# Patient Record
Sex: FEMALE | Race: WHITE | Employment: OTHER | URBAN - METROPOLITAN AREA
[De-identification: names, ages, dates, MRNs, and addresses within clinical notes are randomized per-mention and may not be internally consistent; named-entity substitution may affect disease eponyms.]

---

## 2017-02-09 PROBLEM — I10 HYPERTENSION GOAL BP (BLOOD PRESSURE) < 140/90: Status: ACTIVE | Noted: 2017-02-09

## 2017-02-09 PROBLEM — Z71.3 WEIGHT LOSS COUNSELING, ENCOUNTER FOR: Status: ACTIVE | Noted: 2017-02-09

## 2017-02-09 PROBLEM — Z72.0 TOBACCO ABUSE: Chronic | Status: ACTIVE | Noted: 2017-02-09

## 2017-02-09 PROBLEM — Z72.0 TOBACCO ABUSE: Status: ACTIVE | Noted: 2017-02-09

## 2017-02-09 PROBLEM — K25.3 ACUTE GASTRIC ULCER: Status: ACTIVE | Noted: 2017-02-09

## 2017-02-09 PROBLEM — I10 HYPERTENSION GOAL BP (BLOOD PRESSURE) < 140/90: Chronic | Status: ACTIVE | Noted: 2017-02-09

## 2018-03-06 PROBLEM — E83.42 HYPOMAGNESEMIA: Status: ACTIVE | Noted: 2018-03-06

## 2018-03-15 ENCOUNTER — OFFICE VISIT (OUTPATIENT)
Dept: UROLOGY | Age: 58
End: 2018-03-15
Payer: COMMERCIAL

## 2018-03-15 VITALS
SYSTOLIC BLOOD PRESSURE: 135 MMHG | BODY MASS INDEX: 40.29 KG/M2 | TEMPERATURE: 98.1 F | HEART RATE: 82 BPM | HEIGHT: 61 IN | RESPIRATION RATE: 16 BRPM | WEIGHT: 213.41 LBS | DIASTOLIC BLOOD PRESSURE: 85 MMHG

## 2018-03-15 DIAGNOSIS — N20.1 URETERAL STONE: Primary | ICD-10-CM

## 2018-03-15 PROCEDURE — 99204 OFFICE O/P NEW MOD 45 MIN: CPT | Performed by: UROLOGY

## 2018-03-15 ASSESSMENT — ENCOUNTER SYMPTOMS
EYE PAIN: 0
NAUSEA: 1
COUGH: 0
ABDOMINAL PAIN: 0
EYE REDNESS: 0
BACK PAIN: 1
VOMITING: 0
COLOR CHANGE: 0
WHEEZING: 0
SHORTNESS OF BREATH: 0

## 2018-03-15 NOTE — PROGRESS NOTES
[facility]  [facility] - [place]  [address]  [address]  [address] [facility] [address]  Dept: [address] Shanel Cornejo [facility] Urology Office Note - New Patient    Patient:  Armin Ridley  YOB: 1960  Date: 3/15/2018    The patient is a 65 y.o. female who presents today for evaluation of the following problems:   Chief Complaint   Patient presents with    Nephrolithiasis     passed stone - CT in care everywhere    referred by Ryan Moon MD.    HPI  She is here for kidney stones. She was seen in the ER last week. She was able to pass the stone. Her pain was on the left side. She is now pain free. She has a h/o stones. She has not seen a urologist in the past.  No other voiding complaints. CT showed a 3mm stone at the left UVJ. She has had 4 stones, and they have never required surgery. No family history of stones. (Patient's old records have been requested, reviewed and summarized in today's note.)    Summary of old records: N/A    Additional History: N/A    Procedures Today: N/A    Urinalysis today:  No results found for this visit on 03/15/18. AUA Symptom Score (3/15/2018):   INCOMPLETE EMPTYING: How often have you had the sensation of not emptying your bladder?: Not at all  FREQUENCY: How often do you have to urinate less than every two hours?: Not at all  INTERMITTENCY: How often have you found you stopped and started again several times when you urinated?: Less than 1 to 5 times  URGENCY: How often have you found it difficult to postpone urination?: Less than 1 to 5 times  WEAK STREAM: How often have you had a weak urinary stream?: Not at all  STRAINING: How often have you had to strain to start  urination?: Not at all  NOCTURIA: How many times did you typically get up at night to uriniate?: 3 Times  TOTAL I-PSS SCORE[de-identified] 5  How would you feel if you were to spend the rest of your life with your urinary condition?: Mixe    Last BUN and creatinine:  Lab Results   Component Value Date    BUN 18 2015     Lab Results   Component Value Date    CREATININE 0.77 2015       Additional Lab/Culture results: none    Imaging Reviewed during this Office Visit: none  (results were independently reviewed by physician and radiology report verified)  Reviewed Ct report.    PAST MEDICAL, FAMILY AND SOCIAL HISTORY:  Past Medical History:   Diagnosis Date    Allergic rhinitis     B12 deficiency 2010    Controlled diabetes mellitus type II without complication ([facility] [address]     CTS (carpal tunnel syndrome)     RT    Environmental allergies 2001    Fibromyalgia 2010    Hx of colonic polyps 2001    benign    Hyperlipidemia 2010    Hypertension     Hypokalemia 2010    IBS (irritable bowel syndrome) 2001    IBS (irritable bowel syndrome)     Morbid obesity with BMI of 40.0-44.9, adult ([facility] [address] 2016    Osteoarthritis 2010    Plantar fasciitis of right foot 2014    Renal calculus 202016    Type II or unspecified type diabetes mellitus without mention of complication, not stated as uncontrolled 2010    Vitamin D deficiency 2010     Past Surgical History:   Procedure Laterality Date     SECTION  1984    ELBOW SURGERY      Bilateral    HEMORRHOID SURGERY  1986    KNEE ARTHROSCOPY      LT    POLYPECTOMY      SHOULDER ARTHROSCOPY      Rt    TENDON MANIPULATION      Repair Arthroscopic Rt Ankle    TONSILLECTOMY AND ADENOIDECTOMY      WRIST SURGERY      extensor repair D/T Epicondylitis     Family History   Problem Relation Age of Onset    Asthma Mother     Cancer Father     Cancer Sister     Diabetes Sister     Cancer Maternal Aunt     Heart Disease Maternal Grandfather     Tuberculosis Maternal Grandfather     Cancer Paternal Grandmother      Outpatient Prescriptions Marked as Taking for the 3/15/18 encounter (Office Visit) with Blanche Jesus MD   Medication Sig Dispense Refill    tiZANidine

## 2018-05-15 ENCOUNTER — OFFICE VISIT (OUTPATIENT)
Dept: UROLOGY | Age: 58
End: 2018-05-15
Payer: COMMERCIAL

## 2018-05-15 VITALS
BODY MASS INDEX: 40.02 KG/M2 | DIASTOLIC BLOOD PRESSURE: 82 MMHG | HEIGHT: 61 IN | HEART RATE: 84 BPM | SYSTOLIC BLOOD PRESSURE: 132 MMHG | WEIGHT: 212 LBS

## 2018-05-15 DIAGNOSIS — N20.0 RENAL CALCULUS: Primary | ICD-10-CM

## 2018-05-15 DIAGNOSIS — N20.0 KIDNEY STONES: Primary | ICD-10-CM

## 2018-05-15 PROCEDURE — 99213 OFFICE O/P EST LOW 20 MIN: CPT | Performed by: UROLOGY

## 2018-05-15 ASSESSMENT — ENCOUNTER SYMPTOMS
EYE REDNESS: 0
NAUSEA: 1
EYE PAIN: 0
SHORTNESS OF BREATH: 0
VOMITING: 0
COUGH: 0
BACK PAIN: 1
WHEEZING: 0
ABDOMINAL PAIN: 0

## 2018-05-21 ENCOUNTER — TELEPHONE (OUTPATIENT)
Dept: UROLOGY | Age: 58
End: 2018-05-21

## 2019-01-03 PROBLEM — E66.01 CLASS 2 SEVERE OBESITY DUE TO EXCESS CALORIES WITH SERIOUS COMORBIDITY AND BODY MASS INDEX (BMI) OF 39.0 TO 39.9 IN ADULT (HCC): Status: ACTIVE | Noted: 2019-01-03

## 2019-07-12 ENCOUNTER — TELEPHONE (OUTPATIENT)
Dept: UROLOGY | Age: 59
End: 2019-07-12

## 2019-07-12 DIAGNOSIS — N20.0 KIDNEY STONES: Primary | ICD-10-CM

## 2019-10-03 PROBLEM — Z28.21 REFUSED INFLUENZA VACCINE: Status: ACTIVE | Noted: 2019-10-03

## 2020-11-06 PROBLEM — R19.7 DIARRHEA IN ADULT PATIENT: Status: ACTIVE | Noted: 2017-01-03

## 2021-10-27 ENCOUNTER — OFFICE VISIT (OUTPATIENT)
Dept: PODIATRY | Age: 61
End: 2021-10-27
Payer: COMMERCIAL

## 2021-10-27 VITALS — WEIGHT: 206 LBS | BODY MASS INDEX: 40.44 KG/M2 | HEIGHT: 60 IN

## 2021-10-27 DIAGNOSIS — E66.01 CLASS 3 SEVERE OBESITY DUE TO EXCESS CALORIES WITH SERIOUS COMORBIDITY AND BODY MASS INDEX (BMI) OF 45.0 TO 49.9 IN ADULT (HCC): Primary | ICD-10-CM

## 2021-10-27 DIAGNOSIS — E11.42 TYPE 2 DIABETES MELLITUS WITH DIABETIC POLYNEUROPATHY, WITHOUT LONG-TERM CURRENT USE OF INSULIN (HCC): ICD-10-CM

## 2021-10-27 DIAGNOSIS — E53.8 B12 DEFICIENCY: ICD-10-CM

## 2021-10-27 DIAGNOSIS — B35.1 ONYCHOMYCOSIS: ICD-10-CM

## 2021-10-27 PROCEDURE — 99204 OFFICE O/P NEW MOD 45 MIN: CPT | Performed by: PODIATRIST

## 2021-10-27 PROCEDURE — 3052F HG A1C>EQUAL 8.0%<EQUAL 9.0%: CPT | Performed by: PODIATRIST

## 2021-10-27 RX ORDER — L-METHYLFOLATE-ALGAE-VIT B12-B6 CAP 3-90.314-2-35 MG 3-90.314-2-35 MG
1 CAP ORAL 2 TIMES DAILY
Qty: 60 CAPSULE | Refills: 2 | Status: SHIPPED | OUTPATIENT
Start: 2021-10-27 | End: 2022-05-23

## 2021-10-27 ASSESSMENT — ENCOUNTER SYMPTOMS
COLOR CHANGE: 0
VOMITING: 0
NAUSEA: 0
DIARRHEA: 0
CONSTIPATION: 0

## 2021-10-27 NOTE — PROGRESS NOTES
[facility]  New Patient History and Physical    Chief Complaint:   Chief Complaint   Patient presents with    Numbness     numbness, tingling and burning in toes on both feet     Diabetes       HPI: Erasmo Moraes is a 62 y.o. female who presents to the office today complaining of numbness, pain, burning in toes both feet. Has been a diabetic for over 10 years, good control. Also has fibromyalgia, back pain. Was on neurontin for pain, but was taken off. Tried Cymbalta and lyrica in the past for fibromyalgia pain. Also noticed nails, both big toes are discolored, keeps them painted. . Currently denies F/C/N/V. Allergies   Allergen Reactions    Biaxin [Clarithromycin] Swelling    Norvasc [Amlodipine Besylate]      Leg swelling    Codeine Rash     Tylenol #3    Fish Oil Nausea And Vomiting    Lyrica [Pregabalin] Nausea And Vomiting    Milnacipran Hcl Nausea And Vomiting       Past Medical History:   Diagnosis Date    Allergic rhinitis     B12 deficiency 11/2010    Controlled diabetes mellitus type II without complication ([facility]) 3/8/8195    CTS (carpal tunnel syndrome)     RT    Environmental allergies 1/2001    Fibromyalgia 11/2010    Hx of colonic polyps 1/2001    benign    Hyperlipidemia 11/2010    Hypertension     Hypokalemia 11/2010    IBS (irritable bowel syndrome) 1/2001    IBS (irritable bowel syndrome)     Morbid obesity with BMI of 40.0-44.9, adult ([facility] [address] 7/12/2016    Osteoarthritis 11/2010    Plantar fasciitis of right foot 01/2014    Renal calculus 1/202016    Type II or unspecified type diabetes mellitus without mention of complication, not stated as uncontrolled 11/2010    Vitamin D deficiency 11/2010       Prior to Admission medications    Medication Sig Start Date End Date Taking?  Authorizing Provider   L-methylfolate-B6-B12 Melinda Harkins) 0-17.762-4-85 MG CAPS capsule Take 1 capsule by mouth 2 times daily 10/27/21  Yes Richy Sheets DPM   Efinaconazole 10 % SOLN Apply 1 mL topically 2 times daily 10/27/21  Yes Noemy Yates DPM   metoprolol succinate (TOPROL XL) 50 MG extended release tablet take 3 tablets by mouth every morning then take 2 tablets by mouth every evening 10/25/21  Yes Yolie Blanchard MD   HYDROcodone-acetaminophen (NORCO) 5-325 MG per tablet Take 1 tablet by mouth every 8 hours as needed for Pain for up to 30 days.  10/15/21 11/14/21 Yes TIANA Mitchell NP   metFORMIN (GLUCOPHAGE-XR) 500 MG extended release tablet take 4 tablets by mouth once daily with dinner 9/22/21  Yes TIANA Mitchell NP   tiZANidine (ZANAFLEX) 4 MG tablet take 1 tablet by mouth every 8 hours if needed 9/16/21  Yes TIANA Mitchell NP   potassium chloride (KLOR-CON M) 20 MEQ extended release tablet take 3 tablets by mouth once daily 9/13/21  Yes Yolie Blanchard MD   losartan (COZAAR) 100 MG tablet Take 1 tablet by mouth daily 8/31/21  Yes Yolie Blanchard MD   losartan (COZAAR) 100 MG tablet take 1 tablet by mouth once daily 8/31/21  Yes Yolie Blanchard MD   glimepiride (AMARYL) 4 MG tablet take 2 tablets by mouth every morning before breakfast 8/30/21  Yes TIANA Mitchell NP   estradiol (ESTRACE VAGINAL) 0.1 MG/GM vaginal cream Apply 1 gram daily x 1 weeks, then reduce to 1 g of cream 2 times/week 8/30/21  Yes TIANA Mitchell NP   hydroCHLOROthiazide (HYDRODIURIL) 50 MG tablet take 1 tablet by mouth once daily 5/24/21  Yes Yolie Blanchard MD   atorvastatin (LIPITOR) 40 MG tablet take 1 tablet by mouth once daily 3/15/21  Yes Yolie Blanchard MD   Lidocaine-Hydrocortisone Ace 3-0.5 % KIT place 1 applicatorful rectally twice a day USE UP TO 2 WEEKS 2/16/21  Yes Yolie Blanchard MD   rivaroxaban (Jacob Gallardo) 20 MG TABS tablet Take 1 tablet by mouth daily (with breakfast) 7/27/20  Yes Yolie Blanchard MD   ferrous sulfate (IRON 325) 325 (65 Fe) MG tablet Take 1 tablet by mouth 2 times daily 5/26/20  Yes Yolie Blanchard, MD   omeprazole (PRILOSEC) 20 MG delayed release capsule take 1 capsule by mouth once daily 20  Yes TIANA Garcia - NP   triamcinolone (ARISTOCORT) 0.5 % cream Apply topically 2 times daily. 3/27/20  Yes Tiffany Byrd MD   fluticasone [facility]) 50 MCG/ACT nasal spray instill 2 sprays into each nostril once daily 19  Yes TIANA Smith - CNP   Handicap Placard MISC by Does not apply route 5 years, cannot walk over 200 ft. 19  Yes Tiffany Byrd MD   cetirizine (ZYRTEC) 10 MG tablet Take 1 tablet by mouth daily as needed. 11  Yes Cherri Rivera MD       Past Surgical History:   Procedure Laterality Date     SECTION      ELBOW SURGERY      Bilateral    HEMORRHOID SURGERY      KNEE ARTHROSCOPY      LT    POLYPECTOMY      SHOULDER ARTHROSCOPY      Rt    TENDON MANIPULATION      Repair Arthroscopic Rt Ankle    TONSILLECTOMY AND ADENOIDECTOMY      WRIST SURGERY      extensor repair D/T Epicondylitis       Family History   Problem Relation Age of Onset    Asthma Mother     Cancer Father         brain    Cancer Sister         cervical    Diabetes Sister     Heart Disease Maternal Grandfather     Tuberculosis Maternal Grandfather     Cancer Paternal Grandmother         liver    Cancer Maternal Aunt        Social History     Tobacco Use    Smoking status: Former Smoker     Packs/day: 0.50     Years: 21.00     Pack years: 10.50     Quit date:      Years since quitting: 3.8    Smokeless tobacco: Never Used   Substance Use Topics    Alcohol use: No     Alcohol/week: 0.0 standard drinks       Review of Systems   Constitutional: Negative for chills, diaphoresis, fatigue, fever and unexpected weight change. Cardiovascular: Negative for leg swelling. Gastrointestinal: Negative for constipation, diarrhea, nausea and vomiting. Musculoskeletal: Positive for gait problem. Negative for arthralgias and joint swelling.    Skin: Negative CAPS capsule     Sig: Take 1 capsule by mouth 2 times daily     Dispense:  60 capsule     Refill:  2    Efinaconazole 10 % SOLN     Sig: Apply 1 mL topically 2 times daily     Dispense:  8 mL     Refill:  2        RTC in 3month(s).     10/27/2021

## 2021-11-10 ENCOUNTER — TELEPHONE (OUTPATIENT)
Dept: PODIATRY | Age: 61
End: 2021-11-10

## 2021-11-10 NOTE — TELEPHONE ENCOUNTER
Patient called in concerned as her fungal cream went to an incorrect pharmacy. Patient stated she used the [facility] on [place]. Please advise and address thank you!

## 2021-12-28 ENCOUNTER — OFFICE VISIT (OUTPATIENT)
Dept: OBGYN CLINIC | Age: 61
End: 2021-12-28
Payer: COMMERCIAL

## 2021-12-28 VITALS
HEIGHT: 60 IN | DIASTOLIC BLOOD PRESSURE: 98 MMHG | BODY MASS INDEX: 40.44 KG/M2 | SYSTOLIC BLOOD PRESSURE: 180 MMHG | WEIGHT: 206 LBS

## 2021-12-28 DIAGNOSIS — Z11.51 SCREENING FOR HPV (HUMAN PAPILLOMAVIRUS): ICD-10-CM

## 2021-12-28 DIAGNOSIS — N76.0 ACUTE VAGINITIS: ICD-10-CM

## 2021-12-28 DIAGNOSIS — Z01.419 VISIT FOR GYNECOLOGIC EXAMINATION: Primary | ICD-10-CM

## 2021-12-28 DIAGNOSIS — Z12.31 ENCOUNTER FOR SCREENING MAMMOGRAM FOR MALIGNANT NEOPLASM OF BREAST: ICD-10-CM

## 2021-12-28 PROCEDURE — 99386 PREV VISIT NEW AGE 40-64: CPT | Performed by: NURSE PRACTITIONER

## 2021-12-28 RX ORDER — CLOTRIMAZOLE AND BETAMETHASONE DIPROPIONATE 10; .64 MG/G; MG/G
CREAM TOPICAL
Qty: 45 G | Refills: 1 | Status: SHIPPED | OUTPATIENT
Start: 2021-12-28

## 2021-12-28 RX ORDER — FLUCONAZOLE 150 MG/1
150 TABLET ORAL ONCE
Qty: 2 TABLET | Refills: 2 | Status: SHIPPED | OUTPATIENT
Start: 2021-12-28 | End: 2021-12-28

## 2021-12-28 ASSESSMENT — PATIENT HEALTH QUESTIONNAIRE - PHQ9
SUM OF ALL RESPONSES TO PHQ QUESTIONS 1-9: 0
1. LITTLE INTEREST OR PLEASURE IN DOING THINGS: 0
SUM OF ALL RESPONSES TO PHQ9 QUESTIONS 1 & 2: 0
SUM OF ALL RESPONSES TO PHQ QUESTIONS 1-9: 0
SUM OF ALL RESPONSES TO PHQ QUESTIONS 1-9: 0
2. FEELING DOWN, DEPRESSED OR HOPELESS: 0

## 2021-12-28 NOTE — PROGRESS NOTES
History and Physical  [address], [address] [facility] [address], [address] [facility] [phone]   Fax [phone]  Val Arrington  12/28/2021              62 y.o. Chief Complaint   Patient presents with    Gynecologic Exam       Patient's last menstrual period was 05/20/2011. Primary Care Physician: Jl Lassiter MD    The patient was seen and examined. She has no chief complaint today and is here for her annual exam.  Her bowels are regular. There are no voiding complaints. She denies any bloating. She denies vaginal discharge and was counseled on STD's and the need for barrier contraception. HPI : Val Arrington is a 62 y.o. female No obstetric history on file. Annual exam  Complaining of vaginal redness, itching, irritation. Patient is diabetic. Not been sexually active for 5 years. Currently has partner but sex is painful so she they are not active. Recently started on vaginal estrace cream couple of weeks ago. Denies leaking urine. Blood pressure 172/82. Rechecked 180/98. Denies headache, chest pain, SOB, blurred vision. Reports took blood pressure medication this am.  To call PCP for further evaluation today.      ________________________________________________________________________  OB History   No obstetric history on file.      Past Medical History:   Diagnosis Date    Allergic rhinitis     B12 deficiency 11/2010    Controlled diabetes mellitus type II without complication ([facility] 3/5/7198    CTS (carpal tunnel syndrome)     RT    Environmental allergies 1/2001    Fibromyalgia 11/2010    Hx of colonic polyps 1/2001    benign    Hyperlipidemia 11/2010    Hypertension     Hypokalemia 11/2010    IBS (irritable bowel syndrome) 1/2001    IBS (irritable bowel syndrome)     Morbid obesity with BMI of 40.0-44.9, adult ([facility] 7/12/2016    Osteoarthritis 11/2010    Plantar fasciitis of right foot 01/2014    Renal calculus     Type II or unspecified type diabetes mellitus without mention of complication, not stated as uncontrolled 2010    Vitamin D deficiency 2010                                                                   Past Surgical History:   Procedure Laterality Date     SECTION  1984    ELBOW SURGERY      Bilateral    HEMORRHOID SURGERY  1986    KNEE ARTHROSCOPY      LT    POLYPECTOMY      SHOULDER ARTHROSCOPY      Rt    TENDON MANIPULATION      Repair Arthroscopic Rt Ankle    TONSILLECTOMY AND ADENOIDECTOMY  1976    WRIST SURGERY      extensor repair D/T Epicondylitis     Family History   Problem Relation Age of Onset    Asthma Mother     Cancer Father         brain    Cancer Sister         cervical    Diabetes Sister     Heart Disease Maternal Grandfather     Tuberculosis Maternal Grandfather     Cancer Paternal Grandmother         liver    Cancer Maternal Aunt      Social History     Socioeconomic History    Marital status:      Spouse name: Not on file    Number of children: Not on file    Years of education: Not on file    Highest education level: Not on file   Occupational History    Not on file   Tobacco Use    Smoking status: Former Smoker     Packs/day: 0.50     Years: 21.00     Pack years: 10.50     Quit date:      Years since quitting: 3.9    Smokeless tobacco: Never Used   Substance and Sexual Activity    Alcohol use: No     Alcohol/week: 0.0 standard drinks    Drug use: No    Sexual activity: Not on file   Other Topics Concern    Not on file   Social History Narrative    Not on file     Social Determinants of Health     Financial Resource Strain:     Difficulty of Paying Living Expenses: Not on file   Food Insecurity:     Worried About [address] in the Last Year: Not on file    Aleyda of Food in the Last Year: Not on file   Transportation Needs:     Lack of Transportation (Medical):  Not on file    Lack of Transportation (Non-Medical): Not on file   Physical Activity:     Days of Exercise per Week: Not on file    Minutes of Exercise per Session: Not on file   Stress:     Feeling of Stress : Not on file   Social Connections:     Frequency of Communication with Friends and Family: Not on file    Frequency of Social Gatherings with Friends and Family: Not on file    Attends Sikh Services: Not on file    Active Member of [address] [place] [address] or Organizations: Not on file    Attends Club or Organization Meetings: Not on file    Marital Status: Not on file   Intimate Partner Violence:     Fear of Current or Ex-Partner: Not on file    Emotionally Abused: Not on file    Physically Abused: Not on file    Sexually Abused: Not on file   Housing Stability:     Unable to Pay for Housing in the Last Year: Not on file    Number of [place] in the Last Year: Not on file    Unstable Housing in the Last Year: Not on file       MEDICATIONS:  Current Outpatient Medications   Medication Sig Dispense Refill    fluconazole (DIFLUCAN) 150 MG tablet Take 1 tablet by mouth once for 1 dose Repeat dose in 7 days 2 tablet 2    clotrimazole-betamethasone (LOTRISONE) 1-0.05 % cream Apply to affected area bid externally x 4 days then daily for 3 days 45 g 1    metFORMIN (GLUCOPHAGE-XR) 500 MG extended release tablet take 4 tablets by mouth once daily with dinner 360 tablet 0    HYDROcodone-acetaminophen (NORCO) 5-325 MG per tablet Take 1 tablet by mouth every 8 hours as needed for Pain for up to 30 days.  90 tablet 0    tiZANidine (ZANAFLEX) 4 MG tablet Take 1 tablet by mouth every 8 hours as needed (spasm) 90 tablet 0    L-methylfolate-B6-B12 (METANX) 1-42.569-3-90 MG CAPS capsule Take 1 capsule by mouth 2 times daily 60 capsule 2    Efinaconazole 10 % SOLN Apply 1 mL topically 2 times daily 8 mL 2    metoprolol succinate (TOPROL XL) 50 MG extended release tablet take 3 tablets by mouth every morning then take 2 tablets by mouth every evening 150 Vomiting 10/13/2011       Symptoms of decreased mood absent  Symptoms of anhedonia absent    **If either question is answered in a  positive fashion then complete the PHQ9 Scoring Evaluation and make the appropriate referral**      Immunization status: stated as current, but no records available. Gynecologic History:  Menarche: 16 yo  Menopause at 48 yo     Patient's last menstrual period was 05/20/2011. Sexually Active: No due to pain    STD History: No     Permanent Sterilization: No   Reversible Birth Control: No        Hormone Replacement Exposure: No      Genetic Qualified Family History of Breast, Ovarian , Colon or Uterine Cancer: No     If YES see scanned worksheet. Preventative Health Testing:    Health Maintenance:  Health Maintenance Due   Topic Date Due    COVID-19 Vaccine (1) Never done    Shingles Vaccine (1 of 2) Never done    Diabetic foot exam  10/22/2020    Breast cancer screen  03/18/2021    Diabetic microalbuminuria test  07/11/2021    Lipid screen  07/11/2021    Potassium monitoring  07/11/2021    Creatinine monitoring  07/11/2021    Cervical cancer screen  08/22/2021    Flu vaccine (1) Never done       Date of Last Pap Smear: 8/22/2016 neg/neg  Abnormal Pap Smear History: denies  Colposcopy History:   Date of Last Mammogram: 3/18/2019 negative  Date of Last Colonoscopy: 2 years ago- followed by Dr Amber Yip  Date of Last Bone Density:      ________________________________________________________________________        REVIEW OF SYSTEMS:    yes   A minimum of an eleven point review of systems was completed. Review Of Systems (11 point):  Constitutional: No fever, chills or malaise;  No weight change or fatigue  Head and Eyes: No vision changes, Headache, Dizziness or trauma in last 12 months  ENT ROS: No hearing, Tinnitis, sinus or taste problems  Hematological and Lymphatic ROS:No Lymphoma, Von Willebrand's, Hemophillia or Bleeding History  Psych ROS: No Depression, Homicidal thoughts,suicidal thoughts, or anxiety  Breast ROS: No breast abnormalities or lumps  Respiratory ROS: No SOB, Pneumoniae,Cough, or Pulmonary Embolism   Cardiovascular ROS: No Chest Pain with Exertion, Palpitations, Syncope, Edema, Arrhythmia. + hypertension, hyperlipidemia  Gastrointestinal ROS: No Indigestion, Heartburn, Nausea, vomiting, Diarrhea, Constipation,or Bowel Changes; No Bloody Stools or melena  Genito-Urinary ROS: No Dysuria, Hematuria or Nocturia. No Urinary Incontinence or Vaginal Discharge  Musculoskeletal ROS: No Arthralgia, Arthritis,Gout,Osteoporosis or Rheumatism  Neurological ROS: No CVA, Migraines, Epilepsy, Seizure Hx, or Limb Weakness. + type II diabetes  Dermatological ROS: No Rash, Itching, Hives, Mole Changes or Cancer                                                                                                                                                                                                                                  PHYSICAL Exam:     Constitutional:  Vitals:    12/28/21 1156 12/28/21 1212   BP: (!) 172/82 (!) 180/98   Site: Right Upper Arm    Position: Sitting    Cuff Size: Medium Adult    Weight: 206 lb (93.4 kg)    Height: 5' (1.524 m)        Chaperone for Intimate Exam   Chaperone was offered and accepted as part of the rooming process.  Chaperone: Nancy Suarez          General Appearance: This  is a well Developed, well Nourished, well groomed female. Her BMI was reviewed. Nutritional decision making was discussed. Skin:  There was a Normal Inspection of the skin without rashes or lesions. There were no rashes. (Papular, Maculopapular, Hives, Pustular, Macular)     There were no lesions (Ulcers, Erythema, Abn. Appearing Nevi)            Lymphatic:  No Lymph Nodes were Palpable in the neck , axilla or groin.  0 # Of Lymph Nodes; Location ; Character [Normal]  [Shotty] [Tender] [Enlarged]     Neck and EENT:  The neck was supple.  There were no masses   The thyroid was not enlarged and had no masses. Perrla, EOMI B/L, TMI B/L No Abnormalities. Throat inspected-No exudates or Masses, Nares Patent No Masses        Respiratory: The lungs were auscultated and found to be clear. There were no rales, rhonchi or wheezes. There was a good respiratory effort. Cardiovascular: The heart was in a regular rate and rhythm. . No S3 or S4. There was no murmur appreciated. Location, grade, and radiation are not applicable. Extremities: The patients extremities were without calf tenderness, edema, or varicosities. There was full range of motion in all four extremities. Pulses in all four extremities were appreciated and are 2/4. Abdomen: The abdomen was soft and non-tender. There were good bowel sounds in all quadrants and there was no guarding, rebound or rigidity. On evaluation there was no evidence of hepatosplenomegaly and there was no costal vertebral kathleen tenderness bilaterally. No hernias were appreciated. Abdominal Scars: C/S scar    Psych: The patient had a normal Orientation to: Time, Place, Person, and Situation  There is no Mood / Affect changes    Breast:  (Chest)  normal appearance, no masses or tenderness  Self breast exams were reviewed in detail. Literature was given. Pelvic Exam:  External genitalia: normal general appearance, hair loss and fat pad loss  Urinary system: urethral meatus normal  Vaginal: atrophic mucosa  Cervix: normal appearance  Adnexa: non palpable  Uterus: normal single, nontender    Rectal Exam:  exam declined by patient          Musculosk:  Normal Gait and station was noted. Digits were evaluated without abnormal findings. Range of motion, stability and strength were evaluated and found to be appropriate for the patients age. ASSESSMENT:      62 y.o. Annual   Diagnosis Orders   1. Visit for gynecologic examination  PAP Smear   2. Screening for HPV (human papillomavirus)  PAP Smear   3.  Encounter for screening mammogram for malignant neoplasm of breast  NEO DIGITAL SCREEN W OR WO CAD BILATERAL   4.  Acute vaginitis  VAGINITIS DNA PROBE    C.trachomatis N.gonorrhoeae DNA    fluconazole (DIFLUCAN) 150 MG tablet    clotrimazole-betamethasone (LOTRISONE) 1-0.05 % cream          Chief Complaint   Patient presents with    Gynecologic Exam          Past Medical History:   Diagnosis Date    Allergic rhinitis     B12 deficiency 11/2010    Controlled diabetes mellitus type II without complication ([facility] 3/3/4503    CTS (carpal tunnel syndrome)     RT    Environmental allergies 1/2001    Fibromyalgia 11/2010    Hx of colonic polyps 1/2001    benign    Hyperlipidemia 11/2010    Hypertension     Hypokalemia 11/2010    IBS (irritable bowel syndrome) 1/2001    IBS (irritable bowel syndrome)     Morbid obesity with BMI of 40.0-44.9, adult ([facility] 7/12/2016    Osteoarthritis 11/2010    Plantar fasciitis of right foot 01/2014    Renal calculus 1/202016    Type II or unspecified type diabetes mellitus without mention of complication, not stated as uncontrolled 11/2010    Vitamin D deficiency 11/2010         Patient Active Problem List    Diagnosis Date Noted    Refused influenza vaccine 10/03/2019    Class 3 severe obesity due to excess calories with serious comorbidity and body mass index (BMI) of 45.0 to 49.9 in adult ([facility] 01/03/2019    Hypomagnesemia 03/06/2018    BMI 39.0-39.9,adult 02/20/2018    Tobacco abuse 02/09/2017    Acute gastric ulcer 02/09/2017    Hypertension goal BP (blood pressure) < 140/90 02/09/2017    Weight loss counseling, encounter for 02/09/2017    Diarrhea in adult patient 01/03/2017    Intractable vomiting with nausea 09/20/2016    Intractable vomiting 09/20/2016    BMI 40.0-44.9, adult ([facility] 07/12/2016    Renal calculus     Left-sided low back pain with left-sided sciatica 12/21/2015    Right shoulder pain 07/09/2015    Right Achilles tendinitis 07/09/2015    Type 2 diabetes mellitus ([facility] 07/09/2015    Mid back pain on left side 06/19/2015    Plantar fasciitis of right foot     Chronic back pain 01/21/2014    B12 deficiency 12/13/2011    Osteoarthritis     Vitamin D deficiency     IBS (irritable bowel syndrome)     Hyperlipidemia     Rhinitis     Hypokalemia     Fibromyalgia           Hereditary Breast, Ovarian, Colon and Uterine Cancer screening Done. Tobacco & Secondary smoke risks reviewed; instructed on cessation and avoidance      Counseling Completed:  Preventative Health Recommendations and Follow up. The patient was informed of the recommended preventative health recommendations. 1. Annuals every year; Cytology collections per prevailing guidelines. 2. Mammograms begin every year at 35 yo if no abnormalities are found and no family history. 3. Bone density studies every 2-3 years. Begin at 71 yo. If no fracture history or osteoporosis family history. (significant). 4. Colonoscopy begin at 40 yo. Repeat every ten years if negative and no family history. 5. Calcium of 9169-9685 mg/day in split dosing  6. Vitamin D 400-800 IU/day  7. All other preventative health recommendations will be managed by the patients Primary care physician. PLAN:  Return in about 1 year (around 12/28/2022) for annual.   Pap smear obtained with vaginal cultures. Screening mammogram ordered  Samples of lubricants given for sexual intercourse. Prescription for diflucan and lotrisone cream  Patient to follow up today with PCP regarding elevated blood pressure. Repeat Annual every 1 year  Cervical Cytology Evaluation begins at 24years old. If Negative Cytology, Follow-up screening per current guidelines. Mammograms every 1 year. If 35 yo and last mammogram was negative. Calcium and Vitamin D dosing reviewed. Colonoscopy screening reviewed as well as onset for bone density testing. Birth control and barrier recommendations discussed.   STD counseling and prevention reviewed. Gardisil counseling completed for all patients 10-37 yo. Routine health maintenance per patients PCP. Orders Placed This Encounter   Procedures    VAGINITIS DNA PROBE     Standing Status:   Future     Standing Expiration Date:   2022    C.trachomatis N.gonorrhoeae DNA     Standing Status:   Future     Standing Expiration Date:   2022    NEO DIGITAL SCREEN W OR WO CAD BILATERAL     Standing Status:   Future     Standing Expiration Date:   2022     Order Specific Question:   Reason for exam:     Answer:   SCREENING    PAP Smear     Patient History:    Patient's last menstrual period was 2011. OBGYN Status: Postmenopausal  Past Surgical History:  :  SECTION  No date: ELBOW SURGERY      Comment:  Bilateral  : HEMORRHOID SURGERY  No date: KNEE ARTHROSCOPY      Comment:  LT  No date: POLYPECTOMY  No date: SHOULDER ARTHROSCOPY      Comment:  Rt  No date: TENDON MANIPULATION      Comment:  Repair Arthroscopic Rt Ankle  : TONSILLECTOMY AND ADENOIDECTOMY  1994: WRIST SURGERY      Comment:  extensor repair D/T Epicondylitis      Social History    Tobacco Use      Smoking status: Former Smoker        Packs/day: 0.50        Years: 21.00        Pack years: 10.5        Quit date:         Years since quitting: 3.9      Smokeless tobacco: Never Used       Standing Status:   Future     Standing Expiration Date:   2022     Order Specific Question:   Collection Type     Answer: Thin Prep     Order Specific Question:   Prior Abnormal Pap Test     Answer:   No     Order Specific Question:   Screening or Diagnostic     Answer:   Screening     Order Specific Question:   HPV Requested? Answer:   Yes     Order Specific Question:   High Risk Patient     Answer:   N/A           The patient, Mauro Berry is a 62 y.o. female, was seen with a total time spent of 20 minutes for the visit on this date of service by the E/M provider.  The time component had both face to face and non face to face time spent in determining the total time component. Counseling and education regarding her diagnosis listed below and her options regarding those diagnoses were also included in determining her time component. Diagnosis Orders   1. Visit for gynecologic examination  PAP Smear   2. Screening for HPV (human papillomavirus)  PAP Smear   3. Encounter for screening mammogram for malignant neoplasm of breast  NEO DIGITAL SCREEN W OR WO CAD BILATERAL   4. Acute vaginitis  VAGINITIS DNA PROBE    C.trachomatis N.gonorrhoeae DNA    fluconazole (DIFLUCAN) 150 MG tablet    clotrimazole-betamethasone (LOTRISONE) 1-0.05 % cream        The patient had her preventative health maintenance recommendations and follow-up reviewed with her at the completion of her visit.

## 2022-01-05 DIAGNOSIS — N76.0 ACUTE VAGINITIS: ICD-10-CM

## 2022-01-27 DIAGNOSIS — Z11.51 SCREENING FOR HPV (HUMAN PAPILLOMAVIRUS): ICD-10-CM

## 2022-01-27 DIAGNOSIS — Z01.419 VISIT FOR GYNECOLOGIC EXAMINATION: ICD-10-CM

## 2022-02-28 PROBLEM — E66.01 CLASS 3 SEVERE OBESITY DUE TO EXCESS CALORIES WITH SERIOUS COMORBIDITY AND BODY MASS INDEX (BMI) OF 45.0 TO 49.9 IN ADULT (HCC): Status: RESOLVED | Noted: 2019-01-03 | Resolved: 2022-02-28

## 2023-09-07 ENCOUNTER — OFFICE VISIT (OUTPATIENT)
Dept: PODIATRY | Age: 63
End: 2023-09-07
Payer: COMMERCIAL

## 2023-09-07 VITALS — WEIGHT: 194 LBS | BODY MASS INDEX: 36.63 KG/M2 | HEIGHT: 61 IN

## 2023-09-07 DIAGNOSIS — B35.1 ONYCHOMYCOSIS: Primary | ICD-10-CM

## 2023-09-07 DIAGNOSIS — E66.01 CLASS 3 SEVERE OBESITY DUE TO EXCESS CALORIES WITH SERIOUS COMORBIDITY AND BODY MASS INDEX (BMI) OF 45.0 TO 49.9 IN ADULT (HCC): ICD-10-CM

## 2023-09-07 DIAGNOSIS — E11.42 TYPE 2 DIABETES MELLITUS WITH DIABETIC POLYNEUROPATHY, WITHOUT LONG-TERM CURRENT USE OF INSULIN (HCC): ICD-10-CM

## 2023-09-07 DIAGNOSIS — L60.0 OC (ONYCHOCRYPTOSIS): ICD-10-CM

## 2023-09-07 DIAGNOSIS — M79.674 PAIN OF GREAT TOE, RIGHT: ICD-10-CM

## 2023-09-07 PROCEDURE — 99214 OFFICE O/P EST MOD 30 MIN: CPT | Performed by: PODIATRIST

## 2023-09-07 RX ORDER — DOXYCYCLINE HYCLATE 100 MG/1
CAPSULE ORAL
COMMUNITY
Start: 2023-06-02

## 2023-09-07 RX ORDER — CEPHALEXIN 500 MG/1
CAPSULE ORAL
COMMUNITY
Start: 2023-06-29

## 2023-09-07 RX ORDER — ORAL SEMAGLUTIDE 7 MG/1
1 TABLET ORAL DAILY
COMMUNITY
Start: 2023-08-21

## 2023-09-07 RX ORDER — ERGOCALCIFEROL 1.25 MG/1
50000 CAPSULE ORAL WEEKLY
COMMUNITY
Start: 2023-04-02

## 2023-09-07 RX ORDER — CYANOCOBALAMIN 1000 UG/ML
1000 INJECTION, SOLUTION INTRAMUSCULAR; SUBCUTANEOUS
COMMUNITY
Start: 2023-04-03

## 2023-09-07 ASSESSMENT — ENCOUNTER SYMPTOMS
NAUSEA: 0
COLOR CHANGE: 0
DIARRHEA: 0
CONSTIPATION: 0
VOMITING: 0

## 2023-09-07 NOTE — PROGRESS NOTES
[facility]  Return Patient History and Physical    Chief Complaint:   Chief Complaint   Patient presents with    Toe Pain     Right hallux   PCP Lynette Kim-6/29/2023    Diabetes     Last blood sugar 158       HPI: Ester Ayers is a 59 y.o. female who presents to the office today complaining of pain right big toe. Pain started in May. Had redness, was on antibiotics 4x, feels better now no pain. .,     numbness, pain, burning in toes both feet. Has been a diabetic for over 10 years, good control. Also has fibromyalgia, back pain. Was on neurontin for pain, but was taken off. Tried Cymbalta and lyrica in the past for fibromyalgia pain. Also noticed nails, both big toes are discolored, keeps them painted. . Currently denies F/C/N/V. Allergies   Allergen Reactions    Latex     Biaxin [Clarithromycin] Swelling    Gabapentin Other (See Comments)     Suicidal thoughts    Norvasc [Amlodipine Besylate]      Leg swelling    Codeine Rash     Tylenol #3    Fish Oil Nausea And Vomiting    Lyrica [Pregabalin] Nausea And Vomiting    Milnacipran Hcl Nausea And Vomiting       Past Medical History:   Diagnosis Date    Allergic rhinitis     B12 deficiency 11/2010    Controlled diabetes mellitus type II without complication ([address]) 9/3/1435    CTS (carpal tunnel syndrome)     RT    Environmental allergies 1/2001    Fibromyalgia 11/2010    Hx of colonic polyps 1/2001    benign    Hyperlipidemia 11/2010    Hypertension     Hypokalemia 11/2010    IBS (irritable bowel syndrome) 1/2001    IBS (irritable bowel syndrome)     Morbid obesity with BMI of 40.0-44.9, adult ([address]) 7/12/2016    Osteoarthritis 11/2010    Plantar fasciitis of right foot 01/2014    Renal calculus 1/202016    Type II or unspecified type diabetes mellitus without mention of complication, not stated as uncontrolled 11/2010    Vitamin D deficiency 11/2010       Prior to Admission medications    Medication Sig Start Date End Date Taking?

## 2023-10-16 ENCOUNTER — TELEPHONE (OUTPATIENT)
Dept: PODIATRY | Age: 63
End: 2023-10-16

## 2023-10-16 RX ORDER — DOXYCYCLINE HYCLATE 100 MG/1
100 CAPSULE ORAL 2 TIMES DAILY
Qty: 28 CAPSULE | Refills: 0 | Status: SHIPPED | OUTPATIENT
Start: 2023-10-16 | End: 2023-10-30

## 2023-10-16 NOTE — TELEPHONE ENCOUNTER
Patient requesting to see Dr. Trevor Sifuentes, states her left big toe red and swollen, unable to reach office, please call patient [phone] [facility]/sc

## 2023-11-01 ENCOUNTER — OFFICE VISIT (OUTPATIENT)
Dept: PODIATRY | Age: 63
End: 2023-11-01
Payer: COMMERCIAL

## 2023-11-01 VITALS — HEIGHT: 61 IN | WEIGHT: 194 LBS | BODY MASS INDEX: 36.63 KG/M2

## 2023-11-01 DIAGNOSIS — L60.0 OC (ONYCHOCRYPTOSIS): Primary | ICD-10-CM

## 2023-11-01 DIAGNOSIS — M79.675 PAIN OF GREAT TOE, LEFT: ICD-10-CM

## 2023-11-01 DIAGNOSIS — L03.032 PARONYCHIA OF GREAT TOE, LEFT: ICD-10-CM

## 2023-11-01 PROCEDURE — 99999 PR OFFICE/OUTPT VISIT,PROCEDURE ONLY: CPT | Performed by: PODIATRIST

## 2023-11-01 PROCEDURE — 11730 AVULSION NAIL PLATE SIMPLE 1: CPT | Performed by: PODIATRIST

## 2023-11-01 ASSESSMENT — ENCOUNTER SYMPTOMS
VOMITING: 0
CONSTIPATION: 0
DIARRHEA: 0
COLOR CHANGE: 0
NAUSEA: 0

## 2023-11-01 NOTE — PROGRESS NOTES
[facility]  Return Patient History and Physical    Chief Complaint:   Chief Complaint   Patient presents with    Toe Pain     Left hallux     Diabetes     Last blood sugar 145       HPI: Caro Humphries is a 59 y.o. female who presents to the office today complaining of pain left big toe. Pain started ia few weeks ago. I sent antibiotics. She finished yesterday, doing better, some pain still. . Had redness,     numbness, pain, burning in toes both feet. Has been a diabetic for over 10 years, good control. Also has fibromyalgia, back pain. Was on neurontin for pain, but was taken off. Tried Cymbalta and lyrica in the past for fibromyalgia pain. Also noticed nails, both big toes are discolored, keeps them painted. . Currently denies F/C/N/V. Allergies   Allergen Reactions    Latex     Biaxin [Clarithromycin] Swelling    Gabapentin Other (See Comments)     Suicidal thoughts    Norvasc [Amlodipine Besylate]      Leg swelling    Codeine Rash     Tylenol #3    Fish Oil Nausea And Vomiting    Lyrica [Pregabalin] Nausea And Vomiting    Milnacipran Hcl Nausea And Vomiting       Past Medical History:   Diagnosis Date    Allergic rhinitis     B12 deficiency 11/2010    Controlled diabetes mellitus type II without complication ([address] 8/1/9768    CTS (carpal tunnel syndrome)     RT    Environmental allergies 1/2001    Fibromyalgia 11/2010    Hx of colonic polyps 1/2001    benign    Hyperlipidemia 11/2010    Hypertension     Hypokalemia 11/2010    IBS (irritable bowel syndrome) 1/2001    IBS (irritable bowel syndrome)     Morbid obesity with BMI of 40.0-44.9, adult ([address]) 7/12/2016    Osteoarthritis 11/2010    Plantar fasciitis of right foot 01/2014    Renal calculus 1/202016    Type II or unspecified type diabetes mellitus without mention of complication, not stated as uncontrolled 11/2010    Vitamin D deficiency 11/2010       Prior to Admission medications    Medication Sig Start Date End Date Taking?